# Patient Record
Sex: MALE | Race: WHITE | Employment: FULL TIME | URBAN - METROPOLITAN AREA
[De-identification: names, ages, dates, MRNs, and addresses within clinical notes are randomized per-mention and may not be internally consistent; named-entity substitution may affect disease eponyms.]

---

## 2022-09-26 ENCOUNTER — OFFICE VISIT (OUTPATIENT)
Dept: PRIMARY CARE CLINIC | Age: 32
End: 2022-09-26
Payer: COMMERCIAL

## 2022-09-26 VITALS
DIASTOLIC BLOOD PRESSURE: 78 MMHG | BODY MASS INDEX: 26.72 KG/M2 | HEIGHT: 73 IN | SYSTOLIC BLOOD PRESSURE: 130 MMHG | OXYGEN SATURATION: 98 % | HEART RATE: 62 BPM | WEIGHT: 201.6 LBS | RESPIRATION RATE: 14 BRPM

## 2022-09-26 DIAGNOSIS — R10.9 ABDOMINAL PAIN, UNSPECIFIED ABDOMINAL LOCATION: ICD-10-CM

## 2022-09-26 DIAGNOSIS — Z09 HOSPITAL DISCHARGE FOLLOW-UP: Primary | ICD-10-CM

## 2022-09-26 PROCEDURE — 99214 OFFICE O/P EST MOD 30 MIN: CPT | Performed by: INTERNAL MEDICINE

## 2022-09-26 PROCEDURE — 1111F DSCHRG MED/CURRENT MED MERGE: CPT | Performed by: INTERNAL MEDICINE

## 2022-09-26 SDOH — ECONOMIC STABILITY: FOOD INSECURITY: WITHIN THE PAST 12 MONTHS, YOU WORRIED THAT YOUR FOOD WOULD RUN OUT BEFORE YOU GOT MONEY TO BUY MORE.: NEVER TRUE

## 2022-09-26 SDOH — ECONOMIC STABILITY: FOOD INSECURITY: WITHIN THE PAST 12 MONTHS, THE FOOD YOU BOUGHT JUST DIDN'T LAST AND YOU DIDN'T HAVE MONEY TO GET MORE.: NEVER TRUE

## 2022-09-26 ASSESSMENT — PATIENT HEALTH QUESTIONNAIRE - PHQ9
SUM OF ALL RESPONSES TO PHQ QUESTIONS 1-9: 0
SUM OF ALL RESPONSES TO PHQ9 QUESTIONS 1 & 2: 0
2. FEELING DOWN, DEPRESSED OR HOPELESS: 0
SUM OF ALL RESPONSES TO PHQ QUESTIONS 1-9: 0
SUM OF ALL RESPONSES TO PHQ QUESTIONS 1-9: 0
1. LITTLE INTEREST OR PLEASURE IN DOING THINGS: 0
SUM OF ALL RESPONSES TO PHQ QUESTIONS 1-9: 0

## 2022-09-26 ASSESSMENT — SOCIAL DETERMINANTS OF HEALTH (SDOH): HOW HARD IS IT FOR YOU TO PAY FOR THE VERY BASICS LIKE FOOD, HOUSING, MEDICAL CARE, AND HEATING?: NOT HARD AT ALL

## 2022-09-26 NOTE — PROGRESS NOTES
Diagnosis    Hospital discharge follow-up    Abdominal pain       Medications listed as ordered at the time of discharge from hospital     Medication List      as of September 26, 2022  2:33 PM     You have not been prescribed any medications. Medications marked \"taking\" at this time  No outpatient medications have been marked as taking for the 9/26/22 encounter (Office Visit) with Mayra Trevino MD.        Medications patient taking as of now reconciled against medications ordered at time of hospital discharge: Yes    A comprehensive review of systems was negative except for what was noted in the HPI. Objective:    /78 (Site: Left Upper Arm, Position: Sitting, Cuff Size: Large Adult)   Pulse 62   Resp 14   Ht 6' 0.5\" (1.842 m)   Wt 201 lb 9.6 oz (91.4 kg)   SpO2 98%   BMI 26.97 kg/m²     Patient in no acute respiratory distress or painful distress. Normal appearance, not ill looking    Normocephalic, PERRL, EOM movements intact, no nystagmus,     Mucous membrnes moist and pink. No scleral icterus    Vesicular BS, no wheeze ronchi, crackles or other adventitious sounds    S1S2 regular rate and rhythm, no murmur gallops or rubs    Soft depressible not tender on palpation, no guarding or rebound, no signs of ascites    No signs of edema to legs or feet    Alert and Oriented to time, place and person. No focal signs or neurological deficits. Muscle power grade 5/5 in major muscle groups of all 4 limbs. Cranial nerves III-XII intact and WNL. An electronic signature was used to authenticate this note. --Mac Lucero MD

## 2022-09-26 NOTE — ASSESSMENT & PLAN NOTE
Patient comes in today for hospital discharge follow-up. Patient was seen at  the hospital on 9/21/2022 and diagnosed with unspecified abdominal and chest pain.   Please see below for further details

## 2022-09-26 NOTE — ASSESSMENT & PLAN NOTE
Denies recurrence of symptoms since day he visited ER he is  80% close to his baseline  Will monitor

## 2022-10-26 PROBLEM — Z09 HOSPITAL DISCHARGE FOLLOW-UP: Status: RESOLVED | Noted: 2022-09-26 | Resolved: 2022-10-26
